# Patient Record
Sex: FEMALE | Race: WHITE | NOT HISPANIC OR LATINO | Employment: OTHER | URBAN - NONMETROPOLITAN AREA
[De-identification: names, ages, dates, MRNs, and addresses within clinical notes are randomized per-mention and may not be internally consistent; named-entity substitution may affect disease eponyms.]

---

## 2017-07-03 ENCOUNTER — APPOINTMENT (OUTPATIENT)
Dept: CT IMAGING | Facility: HOSPITAL | Age: 52
End: 2017-07-03

## 2017-07-03 ENCOUNTER — HOSPITAL ENCOUNTER (OUTPATIENT)
Facility: HOSPITAL | Age: 52
Setting detail: OBSERVATION
Discharge: HOME | End: 2017-07-04
Attending: INTERNAL MEDICINE | Admitting: INTERNAL MEDICINE

## 2017-07-03 ENCOUNTER — APPOINTMENT (OUTPATIENT)
Dept: GENERAL RADIOLOGY | Facility: HOSPITAL | Age: 52
End: 2017-07-03

## 2017-07-03 DIAGNOSIS — R07.2 PRECORDIAL PAIN: Primary | ICD-10-CM

## 2017-07-03 DIAGNOSIS — R74.8 ELEVATED AMYLASE AND LIPASE: ICD-10-CM

## 2017-07-03 LAB
ALBUMIN SERPL-MCNC: 4.4 G/DL (ref 3.5–5)
ALBUMIN/GLOB SERPL: 1.2 G/DL (ref 1.1–2.5)
ALP SERPL-CCNC: 102 U/L (ref 24–120)
ALT SERPL W P-5'-P-CCNC: 28 U/L (ref 0–54)
AMYLASE SERPL-CCNC: 209 U/L (ref 30–110)
ANION GAP SERPL CALCULATED.3IONS-SCNC: 11 MMOL/L (ref 4–13)
APTT PPP: 27.2 SECONDS (ref 24.1–34.8)
AST SERPL-CCNC: 24 U/L (ref 7–45)
BASOPHILS # BLD AUTO: 0.03 10*3/MM3 (ref 0–0.2)
BASOPHILS NFR BLD AUTO: 0.3 % (ref 0–2)
BILIRUB SERPL-MCNC: 0.4 MG/DL (ref 0.1–1)
BUN BLD-MCNC: 11 MG/DL (ref 5–21)
BUN/CREAT SERPL: 15.3 (ref 7–25)
CALCIUM SPEC-SCNC: 9.9 MG/DL (ref 8.4–10.4)
CHLORIDE SERPL-SCNC: 101 MMOL/L (ref 98–110)
CO2 SERPL-SCNC: 25 MMOL/L (ref 24–31)
CREAT BLD-MCNC: 0.72 MG/DL (ref 0.5–1.4)
D DIMER PPP FEU-MCNC: 0.55 MG/L (FEU) (ref 0–0.5)
DEPRECATED RDW RBC AUTO: 43.4 FL (ref 40–54)
EOSINOPHIL # BLD AUTO: 0.07 10*3/MM3 (ref 0–0.7)
EOSINOPHIL NFR BLD AUTO: 0.7 % (ref 0–4)
ERYTHROCYTE [DISTWIDTH] IN BLOOD BY AUTOMATED COUNT: 14.4 % (ref 12–15)
GFR SERPL CREATININE-BSD FRML MDRD: 85 ML/MIN/1.73
GLOBULIN UR ELPH-MCNC: 3.6 GM/DL
GLUCOSE BLD-MCNC: 96 MG/DL (ref 70–100)
HCT VFR BLD AUTO: 34.9 % (ref 37–47)
HGB BLD-MCNC: 11.2 G/DL (ref 12–16)
HOLD SPECIMEN: NORMAL
IMM GRANULOCYTES # BLD: 0.02 10*3/MM3 (ref 0–0.03)
IMM GRANULOCYTES NFR BLD: 0.2 % (ref 0–5)
INR PPP: 0.89 (ref 0.91–1.09)
LIPASE SERPL-CCNC: 470 U/L (ref 23–203)
LYMPHOCYTES # BLD AUTO: 1.98 10*3/MM3 (ref 0.72–4.86)
LYMPHOCYTES NFR BLD AUTO: 21.2 % (ref 15–45)
MCH RBC QN AUTO: 26 PG (ref 28–32)
MCHC RBC AUTO-ENTMCNC: 32.1 G/DL (ref 33–36)
MCV RBC AUTO: 81 FL (ref 82–98)
MONOCYTES # BLD AUTO: 0.74 10*3/MM3 (ref 0.19–1.3)
MONOCYTES NFR BLD AUTO: 7.9 % (ref 4–12)
NEUTROPHILS # BLD AUTO: 6.51 10*3/MM3 (ref 1.87–8.4)
NEUTROPHILS NFR BLD AUTO: 69.7 % (ref 39–78)
PLATELET # BLD AUTO: 309 10*3/MM3 (ref 130–400)
PMV BLD AUTO: 10 FL (ref 6–12)
POTASSIUM BLD-SCNC: 3.9 MMOL/L (ref 3.5–5.3)
PROT SERPL-MCNC: 8 G/DL (ref 6.3–8.7)
PROTHROMBIN TIME: 12.3 SECONDS (ref 11.9–14.6)
RBC # BLD AUTO: 4.31 10*6/MM3 (ref 4.2–5.4)
SODIUM BLD-SCNC: 137 MMOL/L (ref 135–145)
TROPONIN I SERPL-MCNC: <0.012 NG/ML (ref 0–0.03)
TROPONIN I SERPL-MCNC: <0.012 NG/ML (ref 0–0.03)
WBC NRBC COR # BLD: 9.35 10*3/MM3 (ref 4.8–10.8)
WHOLE BLOOD HOLD SPECIMEN: NORMAL
WHOLE BLOOD HOLD SPECIMEN: NORMAL

## 2017-07-03 PROCEDURE — 93005 ELECTROCARDIOGRAM TRACING: CPT | Performed by: INTERNAL MEDICINE

## 2017-07-03 PROCEDURE — 93005 ELECTROCARDIOGRAM TRACING: CPT | Performed by: NURSE PRACTITIONER

## 2017-07-03 PROCEDURE — 82150 ASSAY OF AMYLASE: CPT | Performed by: NURSE PRACTITIONER

## 2017-07-03 PROCEDURE — 71275 CT ANGIOGRAPHY CHEST: CPT

## 2017-07-03 PROCEDURE — 85379 FIBRIN DEGRADATION QUANT: CPT | Performed by: NURSE PRACTITIONER

## 2017-07-03 PROCEDURE — 93005 ELECTROCARDIOGRAM TRACING: CPT

## 2017-07-03 PROCEDURE — G0378 HOSPITAL OBSERVATION PER HR: HCPCS

## 2017-07-03 PROCEDURE — 25010000002 ENOXAPARIN PER 10 MG: Performed by: NURSE PRACTITIONER

## 2017-07-03 PROCEDURE — 85025 COMPLETE CBC W/AUTO DIFF WBC: CPT | Performed by: NURSE PRACTITIONER

## 2017-07-03 PROCEDURE — 93010 ELECTROCARDIOGRAM REPORT: CPT | Performed by: INTERNAL MEDICINE

## 2017-07-03 PROCEDURE — 96372 THER/PROPH/DIAG INJ SC/IM: CPT

## 2017-07-03 PROCEDURE — 71010 HC CHEST PA OR AP: CPT

## 2017-07-03 PROCEDURE — 83690 ASSAY OF LIPASE: CPT | Performed by: NURSE PRACTITIONER

## 2017-07-03 PROCEDURE — 85610 PROTHROMBIN TIME: CPT | Performed by: NURSE PRACTITIONER

## 2017-07-03 PROCEDURE — 80053 COMPREHEN METABOLIC PANEL: CPT | Performed by: NURSE PRACTITIONER

## 2017-07-03 PROCEDURE — 96374 THER/PROPH/DIAG INJ IV PUSH: CPT

## 2017-07-03 PROCEDURE — 84484 ASSAY OF TROPONIN QUANT: CPT | Performed by: NURSE PRACTITIONER

## 2017-07-03 PROCEDURE — 0 IOPAMIDOL PER 1 ML: Performed by: NURSE PRACTITIONER

## 2017-07-03 PROCEDURE — 99285 EMERGENCY DEPT VISIT HI MDM: CPT

## 2017-07-03 PROCEDURE — 85730 THROMBOPLASTIN TIME PARTIAL: CPT | Performed by: NURSE PRACTITIONER

## 2017-07-03 RX ORDER — ASPIRIN 81 MG/1
81 TABLET ORAL DAILY
Status: DISCONTINUED | OUTPATIENT
Start: 2017-07-04 | End: 2017-07-04 | Stop reason: HOSPADM

## 2017-07-03 RX ORDER — PANTOPRAZOLE SODIUM 40 MG/10ML
40 INJECTION, POWDER, LYOPHILIZED, FOR SOLUTION INTRAVENOUS ONCE
Status: COMPLETED | OUTPATIENT
Start: 2017-07-03 | End: 2017-07-03

## 2017-07-03 RX ORDER — SERTRALINE HYDROCHLORIDE 100 MG/1
200 TABLET, FILM COATED ORAL NIGHTLY
Status: DISCONTINUED | OUTPATIENT
Start: 2017-07-03 | End: 2017-07-04 | Stop reason: HOSPADM

## 2017-07-03 RX ORDER — SERTRALINE HYDROCHLORIDE 100 MG/1
200 TABLET, FILM COATED ORAL NIGHTLY
COMMUNITY

## 2017-07-03 RX ORDER — SODIUM CHLORIDE 0.9 % (FLUSH) 0.9 %
1-10 SYRINGE (ML) INJECTION AS NEEDED
Status: DISCONTINUED | OUTPATIENT
Start: 2017-07-03 | End: 2017-07-04 | Stop reason: HOSPADM

## 2017-07-03 RX ORDER — PANTOPRAZOLE SODIUM 40 MG/10ML
40 INJECTION, POWDER, LYOPHILIZED, FOR SOLUTION INTRAVENOUS ONCE
Status: DISCONTINUED | OUTPATIENT
Start: 2017-07-03 | End: 2017-07-03

## 2017-07-03 RX ORDER — ASPIRIN 81 MG/1
324 TABLET, CHEWABLE ORAL ONCE
Status: COMPLETED | OUTPATIENT
Start: 2017-07-03 | End: 2017-07-03

## 2017-07-03 RX ORDER — PANTOPRAZOLE SODIUM 40 MG/1
40 TABLET, DELAYED RELEASE ORAL
Status: DISCONTINUED | OUTPATIENT
Start: 2017-07-04 | End: 2017-07-04 | Stop reason: HOSPADM

## 2017-07-03 RX ORDER — SODIUM CHLORIDE 0.9 % (FLUSH) 0.9 %
10 SYRINGE (ML) INJECTION AS NEEDED
Status: DISCONTINUED | OUTPATIENT
Start: 2017-07-03 | End: 2017-07-04 | Stop reason: HOSPADM

## 2017-07-03 RX ORDER — ONDANSETRON 2 MG/ML
4 INJECTION INTRAMUSCULAR; INTRAVENOUS EVERY 6 HOURS PRN
Status: DISCONTINUED | OUTPATIENT
Start: 2017-07-03 | End: 2017-07-04 | Stop reason: HOSPADM

## 2017-07-03 RX ORDER — HYDROCODONE BITARTRATE AND ACETAMINOPHEN 5; 325 MG/1; MG/1
1 TABLET ORAL EVERY 4 HOURS PRN
Status: DISCONTINUED | OUTPATIENT
Start: 2017-07-03 | End: 2017-07-04 | Stop reason: HOSPADM

## 2017-07-03 RX ORDER — NITROGLYCERIN 0.4 MG/1
0.4 TABLET SUBLINGUAL
Status: COMPLETED | OUTPATIENT
Start: 2017-07-03 | End: 2017-07-03

## 2017-07-03 RX ORDER — LORAZEPAM 1 MG/1
1 TABLET ORAL EVERY 6 HOURS PRN
Status: DISCONTINUED | OUTPATIENT
Start: 2017-07-03 | End: 2017-07-04 | Stop reason: HOSPADM

## 2017-07-03 RX ADMIN — NITROGLYCERIN 0.4 MG: 0.4 TABLET SUBLINGUAL at 20:57

## 2017-07-03 RX ADMIN — NITROGLYCERIN 0.4 MG: 0.4 TABLET SUBLINGUAL at 16:37

## 2017-07-03 RX ADMIN — NITROGLYCERIN 0.4 MG: 0.4 TABLET SUBLINGUAL at 21:13

## 2017-07-03 RX ADMIN — ENOXAPARIN SODIUM 90 MG: 100 INJECTION SUBCUTANEOUS at 18:12

## 2017-07-03 RX ADMIN — Medication 324 MG: at 22:40

## 2017-07-03 RX ADMIN — PANTOPRAZOLE SODIUM 40 MG: 40 INJECTION, POWDER, FOR SOLUTION INTRAVENOUS at 17:58

## 2017-07-03 RX ADMIN — SERTRALINE 200 MG: 100 TABLET, FILM COATED ORAL at 22:41

## 2017-07-03 RX ADMIN — IOPAMIDOL 150 ML: 755 INJECTION, SOLUTION INTRAVENOUS at 17:27

## 2017-07-03 NOTE — ED PROVIDER NOTES
Subjective   HPI Comments: Patient is a 51-year-old white female presents with substernal chest pain or shortness of breath that started around 7:30 this morning.  She states the pain woke her out of her sleep.  She denies any nausea or vomiting.  She states she is having radiation of the pain into her left arm.  She states she was seen at [address] [place] [address] internal medicine and she had an EKG done and was advised that it was okay.  She states she belched and felt some better and went home.  She started states that she started having pain again within the last 2 hours.  She states she has taken 2 full strength aspirin today.    Patient is a 51 y.o. female presenting with chest pain.   History provided by:  Patient   used: No    Chest Pain       Review of Systems   Constitutional: Negative.    HENT: Negative.    Eyes: Negative.    Respiratory: Negative.    Cardiovascular: Positive for chest pain.        Pt presents with substernal chest pain and sob that started about 0730 this am. She states that the pain woke her out of her sleep. She states that she was seen at [facility] internal medicine this am and had ekg this am and was told that it was ok. She states that she belched and felt better. She states that she went on home and then started having chest pain again. She states that the pain is worse when she lies down. She states that she feels that she cannot take a deep breath in. She denies any recent illness. She denies cough. She denies fever or chills. She denies any hx of cardiac disease. She states that she did smoke for about 20 yrs, but stopped smoking about 15 years ago    Gastrointestinal: Negative.    Endocrine: Negative.    Genitourinary: Negative.    Musculoskeletal: Negative.    Skin: Negative.    Allergic/Immunologic: Negative.    Neurological: Negative.    Hematological: Negative.    Psychiatric/Behavioral: Negative.    All other systems reviewed and are negative.      Past Medical  "History:   Diagnosis Date   • Depression        No Known Allergies    History reviewed. No pertinent surgical history.    History reviewed. No pertinent family history.    Social History     Social History   • Marital status: Single     Spouse name: N/A   • Number of children: N/A   • Years of education: N/A     Social History Main Topics   • Smoking status: Never Smoker   • Smokeless tobacco: None   • Alcohol use No   • Drug use: No   • Sexual activity: Defer     Other Topics Concern   • None     Social History Narrative   • None       Prior to Admission medications    Not on File       /79 (Patient Position: Sitting)  Pulse 67  Temp 98.2 °F (36.8 °C) (Oral)   Resp 10  Ht 62\" (157.5 cm)  Wt 188 lb (85.3 kg)  SpO2 98%  BMI 34.39 kg/m2    Objective   Physical Exam   Constitutional: She is oriented to person, place, and time. She appears well-developed and well-nourished.   HENT:   Head: Normocephalic and atraumatic.   Eyes: Conjunctivae and EOM are normal. Pupils are equal, round, and reactive to light.   Neck: Normal range of motion. Neck supple. No tracheal deviation present. No thyromegaly present.   Cardiovascular: Normal rate, regular rhythm, normal heart sounds and intact distal pulses.    Pulmonary/Chest: Effort normal and breath sounds normal. No respiratory distress. She has no wheezes. She has no rales. She exhibits no tenderness.   Abdominal: Soft. Bowel sounds are normal.   Musculoskeletal: Normal range of motion.   Neurological: She is alert and oriented to person, place, and time. She has normal reflexes. No cranial nerve deficit.   Skin: Skin is warm and dry.   Psychiatric: She has a normal mood and affect. Her behavior is normal. Judgment and thought content normal.   Nursing note and vitals reviewed.      Procedures         Lab Results (last 24 hours)     Procedure Component Value Units Date/Time    CBC & Differential [[id number]] Collected:  07/03/17 1627    Specimen:  Blood Updated:  " 07/03/17 1643    Narrative:       The following orders were created for panel order CBC & Differential.  Procedure                               Abnormality         Status                     ---------                               -----------         ------                     CBC Auto Differential[[id number]]        Abnormal            Final result                 Please view results for these tests on the individual orders.    Comprehensive Metabolic Panel [[id number]] Collected:  07/03/17 1627    Specimen:  Blood Updated:  07/03/17 1649     Glucose 96 mg/dL      BUN 11 mg/dL      Creatinine 0.72 mg/dL      Sodium 137 mmol/L      Potassium 3.9 mmol/L      Chloride 101 mmol/L      CO2 25.0 mmol/L      Calcium 9.9 mg/dL      Total Protein 8.0 g/dL      Albumin 4.40 g/dL      ALT (SGPT) 28 U/L      AST (SGOT) 24 U/L      Alkaline Phosphatase 102 U/L      Total Bilirubin 0.4 mg/dL      eGFR Non African Amer 85 mL/min/1.73      Globulin 3.6 gm/dL      A/G Ratio 1.2 g/dL      BUN/Creatinine Ratio 15.3     Anion Gap 11.0 mmol/L     Protime-INR [[id number]]  (Abnormal) Collected:  07/03/17 1627    Specimen:  Blood Updated:  07/03/17 1651     Protime 12.3 Seconds      INR 0.89 (L)    aPTT [[id number]]  (Normal) Collected:  07/03/17 1627    Specimen:  Blood Updated:  07/03/17 1651     PTT 27.2 seconds     D-dimer, Quantitative [[id number]]  (Abnormal) Collected:  07/03/17 1627    Specimen:  Blood Updated:  07/03/17 1651     D-Dimer, Quantitative 0.55 (H) mg/L (FEU)     Narrative:       Reference Range is 0-0.50 mg/L FEU. However, results <0.50 mg/L FEU tends to rule out DVT or PE. Results >0.50 mg/L FEU are not useful in predicting absence or presence of DVT or PE.    Amylase [[id number]]  (Abnormal) Collected:  07/03/17 1627    Specimen:  Blood Updated:  07/03/17 1649     Amylase 209 (H) U/L     Lipase [[id number]]  (Abnormal) Collected:  07/03/17 1627    Specimen:  Blood Updated:  07/03/17 1649     Lipase 470 (H) U/L     CBC  Auto Differential [[id number]]  (Abnormal) Collected:  07/03/17 1627    Specimen:  Blood Updated:  07/03/17 1643     WBC 9.35 10*3/mm3      RBC 4.31 10*6/mm3      Hemoglobin 11.2 (L) g/dL      Hematocrit 34.9 (L) %      MCV 81.0 (L) fL      MCH 26.0 (L) pg      MCHC 32.1 (L) g/dL      RDW 14.4 %      RDW-SD 43.4 fl      MPV 10.0 fL      Platelets 309 10*3/mm3      Neutrophil % 69.7 %      Lymphocyte % 21.2 %      Monocyte % 7.9 %      Eosinophil % 0.7 %      Basophil % 0.3 %      Immature Grans % 0.2 %      Neutrophils, Absolute 6.51 10*3/mm3      Lymphocytes, Absolute 1.98 10*3/mm3      Monocytes, Absolute 0.74 10*3/mm3      Eosinophils, Absolute 0.07 10*3/mm3      Basophils, Absolute 0.03 10*3/mm3      Immature Grans, Absolute 0.02 10*3/mm3     Troponin [[id number]]  (Normal) Collected:  07/03/17 1627    Specimen:  Blood Updated:  07/03/17 1719     Troponin I <0.012 ng/mL     Troponin [[id number]]  (Normal) Collected:  07/03/17 1921    Specimen:  Blood Updated:  07/03/17 1950     Troponin I <0.012 ng/mL           XR Chest 1 View   ED Interpretation   1. No radiographic evidence of acute cardiopulmonary process.           This report was finalized on 07/03/2017 16:42 by Dr. Kasi Quintero MD.      Final Result   1. No radiographic evidence of acute cardiopulmonary process.           This report was finalized on 07/03/2017 16:42 by Dr. Kasi Quintero MD.      CT Angiogram Chest With Contrast   ED Interpretation   Negative exam.   This report was finalized on 07/03/2017 17:39 by Dr. Ramon Vasquez MD.      Final Result   Negative exam.   This report was finalized on 07/03/2017 17:39 by Dr. Ramon Vasquez MD.          ED Course  ED Course   Comment By Time   Reeval after ntg. Pt states that her pain has gone from 7 to a 3 and that she feels much better at this time. Systolic b/p dropped from 120 to 111. Will not administer another ntg at this time LUCERO Melgoza 07/03 7135   Pt states that her pain is still  better at this time. Call placed to hospitalist for further LUCERO Melgoza 07/03 1746   Spoke with dr luong- has accepted for admission LUCERO Melgoza 07/03 1806     HEART Score  History: Slightly suspicious (+0)  ECG: Normal (+0)  Age: 45 through 65 (+1)  Risk Factors: 1 - 2 risk factors (+1)  Troponin: Normal limit or lower (+0)  Total: 2        MDM  Number of Diagnoses or Management Options  Elevated amylase and lipase: new and requires workup  Precordial pain: new and requires workup     Amount and/or Complexity of Data Reviewed  Clinical lab tests: ordered and reviewed  Tests in the radiology section of CPT®: ordered and reviewed  Discuss the patient with other providers: yes (Reviewed pt and pt care plan with dr luong- also assessed pt and in agreement with pt care plan - has accepted for admission)  Independent visualization of images, tracings, or specimens: yes    Patient Progress  Patient progress: stable      Final diagnoses:   Precordial pain   Elevated amylase and lipase            LUCERO Melgoza  07/03/17 1959

## 2017-07-04 ENCOUNTER — APPOINTMENT (OUTPATIENT)
Dept: CARDIOLOGY | Facility: HOSPITAL | Age: 52
End: 2017-07-04

## 2017-07-04 VITALS
HEIGHT: 62 IN | DIASTOLIC BLOOD PRESSURE: 79 MMHG | SYSTOLIC BLOOD PRESSURE: 123 MMHG | TEMPERATURE: 97.8 F | WEIGHT: 186.13 LBS | BODY MASS INDEX: 34.25 KG/M2 | RESPIRATION RATE: 18 BRPM | HEART RATE: 80 BPM | OXYGEN SATURATION: 98 %

## 2017-07-04 LAB
AMYLASE SERPL-CCNC: 107 U/L (ref 30–110)
ANION GAP SERPL CALCULATED.3IONS-SCNC: 11 MMOL/L (ref 4–13)
ANISOCYTOSIS BLD QL: NORMAL
ARTICHOKE IGE QN: 159 MG/DL (ref 0–99)
BACTERIA UR QL AUTO: ABNORMAL /HPF
BH CV STRESS BP STAGE 1: NORMAL
BH CV STRESS BP STAGE 2: NORMAL
BH CV STRESS BP STAGE 3: NORMAL
BH CV STRESS DURATION MIN STAGE 1: 3
BH CV STRESS DURATION MIN STAGE 2: 3
BH CV STRESS DURATION MIN STAGE 3: 3
BH CV STRESS DURATION SEC STAGE 1: 0
BH CV STRESS DURATION SEC STAGE 2: 0
BH CV STRESS DURATION SEC STAGE 3: 0
BH CV STRESS GRADE STAGE 1: 10
BH CV STRESS GRADE STAGE 2: 12
BH CV STRESS GRADE STAGE 3: 14
BH CV STRESS HR STAGE 1: 117
BH CV STRESS HR STAGE 2: 133
BH CV STRESS HR STAGE 3: 153
BH CV STRESS METS STAGE 1: 5
BH CV STRESS METS STAGE 2: 7.5
BH CV STRESS METS STAGE 3: 10
BH CV STRESS PROTOCOL 1: NORMAL
BH CV STRESS RECOVERY BP: NORMAL MMHG
BH CV STRESS RECOVERY HR: 74 BPM
BH CV STRESS SPEED STAGE 1: 1.7
BH CV STRESS SPEED STAGE 2: 2.5
BH CV STRESS SPEED STAGE 3: 3.4
BH CV STRESS STAGE 1: 1
BH CV STRESS STAGE 2: 2
BH CV STRESS STAGE 3: 3
BILIRUB UR QL STRIP: NEGATIVE
BUN BLD-MCNC: 9 MG/DL (ref 5–21)
BUN/CREAT SERPL: 12.3 (ref 7–25)
CALCIUM SPEC-SCNC: 9.3 MG/DL (ref 8.4–10.4)
CHLORIDE SERPL-SCNC: 102 MMOL/L (ref 98–110)
CHOLEST SERPL-MCNC: 232 MG/DL (ref 130–200)
CLARITY UR: CLEAR
CO2 SERPL-SCNC: 27 MMOL/L (ref 24–31)
COLOR UR: YELLOW
CREAT BLD-MCNC: 0.73 MG/DL (ref 0.5–1.4)
DEPRECATED RDW RBC AUTO: 43.3 FL (ref 40–54)
ERYTHROCYTE [DISTWIDTH] IN BLOOD BY AUTOMATED COUNT: 14.6 % (ref 12–15)
GFR SERPL CREATININE-BSD FRML MDRD: 84 ML/MIN/1.73
GLUCOSE BLD-MCNC: 104 MG/DL (ref 70–100)
GLUCOSE UR STRIP-MCNC: NEGATIVE MG/DL
HBA1C MFR BLD: 5.3 %
HCT VFR BLD AUTO: 32.1 % (ref 37–47)
HDLC SERPL-MCNC: 45 MG/DL
HGB BLD-MCNC: 10.4 G/DL (ref 12–16)
HGB UR QL STRIP.AUTO: ABNORMAL
HYALINE CASTS UR QL AUTO: ABNORMAL /LPF
KETONES UR QL STRIP: NEGATIVE
LDLC/HDLC SERPL: 3.49 {RATIO}
LEUKOCYTE ESTERASE UR QL STRIP.AUTO: NEGATIVE
LIPASE SERPL-CCNC: 64 U/L (ref 23–203)
LYMPHOCYTES # BLD MANUAL: 2.77 10*3/MM3 (ref 0.72–4.86)
LYMPHOCYTES NFR BLD MANUAL: 33 % (ref 15–45)
LYMPHOCYTES NFR BLD MANUAL: 5 % (ref 4–12)
MAXIMAL PREDICTED HEART RATE: 169 BPM
MCH RBC QN AUTO: 26.2 PG (ref 28–32)
MCHC RBC AUTO-ENTMCNC: 32.4 G/DL (ref 33–36)
MCV RBC AUTO: 80.9 FL (ref 82–98)
MICROCYTES BLD QL: NORMAL
MONOCYTES # BLD AUTO: 0.42 10*3/MM3 (ref 0.19–1.3)
NEUTROPHILS # BLD AUTO: 5.21 10*3/MM3 (ref 1.87–8.4)
NEUTROPHILS NFR BLD MANUAL: 62 % (ref 39–78)
NITRITE UR QL STRIP: NEGATIVE
PERCENT MAX PREDICTED HR: 90.53 %
PH UR STRIP.AUTO: 6 [PH] (ref 5–8)
PLAT MORPH BLD: NORMAL
PLATELET # BLD AUTO: 253 10*3/MM3 (ref 130–400)
PMV BLD AUTO: 9.5 FL (ref 6–12)
POIKILOCYTOSIS BLD QL SMEAR: NORMAL
POTASSIUM BLD-SCNC: 3.8 MMOL/L (ref 3.5–5.3)
PROT UR QL STRIP: NEGATIVE
RBC # BLD AUTO: 3.97 10*6/MM3 (ref 4.2–5.4)
RBC # UR: ABNORMAL /HPF
REF LAB TEST METHOD: ABNORMAL
SODIUM BLD-SCNC: 140 MMOL/L (ref 135–145)
SP GR UR STRIP: 1.02 (ref 1–1.03)
SQUAMOUS #/AREA URNS HPF: ABNORMAL /HPF
STRESS BASELINE BP: NORMAL MMHG
STRESS BASELINE HR: 80 BPM
STRESS PERCENT HR: 107 %
STRESS POST EXERCISE DUR MIN: 9 MIN
STRESS POST EXERCISE DUR SEC: 0 SEC
STRESS POST PEAK BP: NORMAL MMHG
STRESS POST PEAK HR: 153 BPM
STRESS TARGET HR: 144 BPM
TRIGL SERPL-MCNC: 149 MG/DL (ref 0–149)
TROPONIN I SERPL-MCNC: <0.012 NG/ML (ref 0–0.03)
UROBILINOGEN UR QL STRIP: ABNORMAL
WBC MORPH BLD: NORMAL
WBC NRBC COR # BLD: 8.4 10*3/MM3 (ref 4.8–10.8)
WBC UR QL AUTO: ABNORMAL /HPF

## 2017-07-04 PROCEDURE — 93005 ELECTROCARDIOGRAM TRACING: CPT | Performed by: INTERNAL MEDICINE

## 2017-07-04 PROCEDURE — 93350 STRESS TTE ONLY: CPT | Performed by: INTERNAL MEDICINE

## 2017-07-04 PROCEDURE — 85007 BL SMEAR W/DIFF WBC COUNT: CPT | Performed by: INTERNAL MEDICINE

## 2017-07-04 PROCEDURE — 93010 ELECTROCARDIOGRAM REPORT: CPT | Performed by: INTERNAL MEDICINE

## 2017-07-04 PROCEDURE — G0378 HOSPITAL OBSERVATION PER HR: HCPCS

## 2017-07-04 PROCEDURE — 25010000002 PERFLUTREN (DEFINITY) 8.476 MG IN SODIUM CHLORIDE 10 ML INJECTION: Performed by: INTERNAL MEDICINE

## 2017-07-04 PROCEDURE — 93018 CV STRESS TEST I&R ONLY: CPT | Performed by: INTERNAL MEDICINE

## 2017-07-04 PROCEDURE — C8928 TTE W OR W/O FOL W/CON,STRES: HCPCS

## 2017-07-04 PROCEDURE — 93017 CV STRESS TEST TRACING ONLY: CPT

## 2017-07-04 PROCEDURE — 85027 COMPLETE CBC AUTOMATED: CPT | Performed by: INTERNAL MEDICINE

## 2017-07-04 PROCEDURE — 83690 ASSAY OF LIPASE: CPT | Performed by: INTERNAL MEDICINE

## 2017-07-04 PROCEDURE — 83036 HEMOGLOBIN GLYCOSYLATED A1C: CPT | Performed by: INTERNAL MEDICINE

## 2017-07-04 PROCEDURE — 80061 LIPID PANEL: CPT | Performed by: INTERNAL MEDICINE

## 2017-07-04 PROCEDURE — 84484 ASSAY OF TROPONIN QUANT: CPT | Performed by: INTERNAL MEDICINE

## 2017-07-04 PROCEDURE — 93352 ADMIN ECG CONTRAST AGENT: CPT | Performed by: INTERNAL MEDICINE

## 2017-07-04 PROCEDURE — 82150 ASSAY OF AMYLASE: CPT | Performed by: INTERNAL MEDICINE

## 2017-07-04 PROCEDURE — 81001 URINALYSIS AUTO W/SCOPE: CPT | Performed by: NURSE PRACTITIONER

## 2017-07-04 PROCEDURE — 80048 BASIC METABOLIC PNL TOTAL CA: CPT | Performed by: INTERNAL MEDICINE

## 2017-07-04 PROCEDURE — 36415 COLL VENOUS BLD VENIPUNCTURE: CPT | Performed by: INTERNAL MEDICINE

## 2017-07-04 RX ORDER — ATORVASTATIN CALCIUM 40 MG/1
40 TABLET, FILM COATED ORAL NIGHTLY
Status: DISCONTINUED | OUTPATIENT
Start: 2017-07-04 | End: 2017-07-04 | Stop reason: HOSPADM

## 2017-07-04 RX ORDER — PANTOPRAZOLE SODIUM 40 MG/1
40 TABLET, DELAYED RELEASE ORAL DAILY
Qty: 30 TABLET | Refills: 1 | Status: SHIPPED | OUTPATIENT
Start: 2017-07-04

## 2017-07-04 RX ORDER — ATORVASTATIN CALCIUM 40 MG/1
40 TABLET, FILM COATED ORAL NIGHTLY
Qty: 30 TABLET | Refills: 1 | Status: SHIPPED | OUTPATIENT
Start: 2017-07-04

## 2017-07-04 RX ADMIN — SODIUM CHLORIDE 10 ML: 9 INJECTION INTRAMUSCULAR; INTRAVENOUS; SUBCUTANEOUS at 09:27

## 2017-07-04 RX ADMIN — PANTOPRAZOLE SODIUM 40 MG: 40 TABLET, DELAYED RELEASE ORAL at 06:06

## 2017-07-04 NOTE — H&P
[facility] Medicine Services  HISTORY AND PHYSICAL    Date of Admission: 7/3/2017  Primary Care Physician: LUCERO Klein    Subjective     Chief Complaint: Chest Pain    History of Present Illness  Patient is a 51-year-old white female no significant past medical history.  Who has a strong family history of cardiac disease.  She started having chest pain when she woke this morning.  She went to a local primary care doctor and had an EKG done which was reportedly normal.  With that time she belched and it relieved her symptoms so she went on about her business.  The pain recurred and she came to the emergency room.  In the ER she has had a normal troponin and normal EKG and was given one nitroglycerin sublingually and it immediately relieved her pain.  She has never had anything like this before she denies any dyspnea on exertion she denies any PND swelling.  She used to smoke she quit approximately 15 years ago and she smoked for 20 years prior to that.  She is uncertain about her cholesterol levels.  Currently she is relatively pain free.  She states that the only eliciting factor is taking a deep breath made worse at one point.        Review of Systems   14 point review of systems are negative except for as per HPI  Otherwise complete ROS reviewed and negative except as mentioned in the HPI.      Past Medical History:   Past Medical History:   Diagnosis Date   • Depression        Past Surgical History:History reviewed. No pertinent surgical history.    Social History:  reports that she has never smoked. She does not have any smokeless tobacco history on file. She reports that she does not drink alcohol or use illicit drugs.    Family History: family history is not on file.   Family history is positive for coronary artery disease and both parents and grandparents on both sides.    Allergies:  No Known Allergies    Medications:  Prior to Admission medications    Medication  "Sig Start Date End Date Taking? Authorizing Provider   sertraline (ZOLOFT) 100 MG tablet Take 200 mg by mouth Every Night.   Yes Historical Provider, MD       Objective     Vital Signs: /72  Pulse 76  Temp 97.8 °F (36.6 °C) (Tympanic)   Resp 17  Ht 62\" (157.5 cm)  Wt 188 lb (85.3 kg)  SpO2 98%  BMI 34.39 kg/m2  Physical Exam  Gen.: Well-nourished well-developed white female no acute distress resting comfortably in stretcher  HEENT: Atraumatic normocephalic pupils are equal round reactive to light extra commits intact sclerae are anicteric tympanic membranes are unremarkable mucous membranes are moist oropharynx without erythema or exudate dentition is normal.  Neck is supple without lymphadenopathy no JVD is noted no carotid bruits are auscultated  CV: Regular rate and rhythm normal S1-S2 no gallops murmurs or rubs  Chest: Clear to auscultation percussion bilaterally no tenderness to palpation and sternum or epigastrium  Abdomen: Soft nontender nondistended positive bowel sounds no hepatosplenomegaly no masses are palpated no guarding or rebound tenderness is noted  Extremities: No clubbing edema or cyanosis pedal pulses are 2+ and symmetric  Neuro: Cranial 2-12 grossly intact motor is 5 out of 5 bilaterally sensory exam is nonfocal  Skin: Warm dry and intact no evidence of breakdown tattoo on left wrist        Results Reviewed:  Lab Results (last 24 hours)     Procedure Component Value Units Date/Time    CBC & Differential [[id number]] Collected:  07/03/17 1627    Specimen:  Blood Updated:  07/03/17 1643    Narrative:       The following orders were created for panel order CBC & Differential.  Procedure                               Abnormality         Status                     ---------                               -----------         ------                     CBC Auto Differential[[id number]]        Abnormal            Final result                 Please view results for these tests on the " individual orders.    CBC Auto Differential [[id number]]  (Abnormal) Collected:  07/03/17 1627    Specimen:  Blood Updated:  07/03/17 1643     WBC 9.35 10*3/mm3      RBC 4.31 10*6/mm3      Hemoglobin 11.2 (L) g/dL      Hematocrit 34.9 (L) %      MCV 81.0 (L) fL      MCH 26.0 (L) pg      MCHC 32.1 (L) g/dL      RDW 14.4 %      RDW-SD 43.4 fl      MPV 10.0 fL      Platelets 309 10*3/mm3      Neutrophil % 69.7 %      Lymphocyte % 21.2 %      Monocyte % 7.9 %      Eosinophil % 0.7 %      Basophil % 0.3 %      Immature Grans % 0.2 %      Neutrophils, Absolute 6.51 10*3/mm3      Lymphocytes, Absolute 1.98 10*3/mm3      Monocytes, Absolute 0.74 10*3/mm3      Eosinophils, Absolute 0.07 10*3/mm3      Basophils, Absolute 0.03 10*3/mm3      Immature Grans, Absolute 0.02 10*3/mm3     Comprehensive Metabolic Panel [[id number]] Collected:  07/03/17 1627    Specimen:  Blood Updated:  07/03/17 1649     Glucose 96 mg/dL      BUN 11 mg/dL      Creatinine 0.72 mg/dL      Sodium 137 mmol/L      Potassium 3.9 mmol/L      Chloride 101 mmol/L      CO2 25.0 mmol/L      Calcium 9.9 mg/dL      Total Protein 8.0 g/dL      Albumin 4.40 g/dL      ALT (SGPT) 28 U/L      AST (SGOT) 24 U/L      Alkaline Phosphatase 102 U/L      Total Bilirubin 0.4 mg/dL      eGFR Non African Amer 85 mL/min/1.73      Globulin 3.6 gm/dL      A/G Ratio 1.2 g/dL      BUN/Creatinine Ratio 15.3     Anion Gap 11.0 mmol/L     Amylase [[id number]]  (Abnormal) Collected:  07/03/17 1627    Specimen:  Blood Updated:  07/03/17 1649     Amylase 209 (H) U/L     Lipase [[id number]]  (Abnormal) Collected:  07/03/17 1627    Specimen:  Blood Updated:  07/03/17 1649     Lipase 470 (H) U/L     Protime-INR [[id number]]  (Abnormal) Collected:  07/03/17 1627    Specimen:  Blood Updated:  07/03/17 1651     Protime 12.3 Seconds      INR 0.89 (L)    aPTT [[id number]]  (Normal) Collected:  07/03/17 1627    Specimen:  Blood Updated:  07/03/17 1651     PTT 27.2 seconds     D-dimer, Quantitative  [[id number]]  (Abnormal) Collected:  07/03/17 1627    Specimen:  Blood Updated:  07/03/17 1651     D-Dimer, Quantitative 0.55 (H) mg/L (FEU)     Narrative:       Reference Range is 0-0.50 mg/L FEU. However, results <0.50 mg/L FEU tends to rule out DVT or PE. Results >0.50 mg/L FEU are not useful in predicting absence or presence of DVT or PE.    Troponin [[id number]]  (Normal) Collected:  07/03/17 1627    Specimen:  Blood Updated:  07/03/17 1719     Troponin I <0.012 ng/mL     Light Blue Top [[id number]] Collected:  07/03/17 1627    Specimen:  Blood Updated:  07/03/17 1801     Extra Tube hold for add-on      Auto resulted       Green Top (Gel) [[id number]] Collected:  07/03/17 1627    Specimen:  Blood Updated:  07/03/17 1801     Extra Tube Hold for add-ons.      Auto resulted.       Lavender Top [[id number]] Collected:  07/03/17 1627    Specimen:  Blood Updated:  07/03/17 1801     Extra Tube hold for add-on      Auto resulted       [place] Draw [[id number]] Collected:  07/03/17 1627    Specimen:  Blood Updated:  07/03/17 1829    Narrative:       The following orders were created for panel order [place] Draw.  Procedure                               Abnormality         Status                     ---------                               -----------         ------                     Light Blue Top[[id number]]                                   Final result               Green Top (Gel)[[id number]]                                  Final result               Lavender Top[[id number]]                                     Final result               Red Top[[id number]]                                                                       Please view results for these tests on the individual orders.        Imaging Results (last 24 hours)     Procedure Component Value Units Date/Time    CT Angiogram Chest With Contrast [[id number]] Collected:  07/03/17 1737     Updated:  07/03/17 1745    Narrative:       EXAMINATION:  CT ANGIOGRAM CHEST W  CONTRAST-  7/3/2017 5:01 PM CDT     HISTORY: Chest pain with shortness of breath. Elevated d-dimer.     COMPARISON : No comparison study.     DLP: 525 mGy-cm. Automated dosage control was utilized.     TECHNIQUE: CT angio was performed of the chest with contrast. Coronal,  sagittal and 3-D reconstruction were performed.     FINDINGS: The thoracic aorta is normal in caliber with no aneurysm or  dissection. The pulmonary arteries demonstrate no filling defects. There  is no infiltrate or effusion. The extreme lung bases are not completely  included on the images. I do not believe this is significant. There are  mild degenerative changes of the spine.       Impression:       Negative exam.  This report was finalized on 07/03/2017 17:39 by Dr. Ramon Vasquez MD.    XR Chest 1 View [[id number]] Collected:  07/03/17 1642     Updated:  07/03/17 1745    Narrative:       EXAMINATION:   XR CHEST 1 VW-  7/3/2017 4:42 PM CDT     HISTORY: Chest pain      Frontal upright radiograph of the chest 7/3/2017 4:40 PM CDT     COMPARISON: None.     FINDINGS:   The lungs are clear. The cardiomediastinal silhouette and pulmonary  vascularity are within normal limits.      The osseous structures and surrounding soft tissues demonstrate no acute  abnormality.       Impression:       1. No radiographic evidence of acute cardiopulmonary process.        This report was finalized on 07/03/2017 16:42 by Dr. Kasi Quintero MD.          I have personally reviewed and interpreted the radiology studies and ECG obtained at time of admission.     Assessment / Plan     Assessment & Plan  Hospital Problem List     Precordial pain        1.  Chest pain rule out for myocardial infarction with serial enzymes and EKGs anticoagulating with Lovenox will obtain a stress echo in the morning.  Patient is currently pain-free we will give her an aspirin and monitor for signs symptoms of recurrent chest pain.  We will also check lipid panel morning fasting.  2.   Elevated amylase lipase otherwise asymptomatic recheck enzymes in the morning.  I discussed doing a HIDA scan and ultrasound of her gallbladder she wishes to do this as an outpatient at all possible due to the holiday.  I think this is appropriate.        Code Status: Full  she does not have a healthcare power surrogate and will consider this she is able to make her own decisions at this time.     I discussed the patients findings and my recommendations with patient   Estimated length of stay one day   Vinicio Ray MD   07/03/17   7:03 PM

## 2017-07-04 NOTE — DISCHARGE SUMMARY
"    [facility] Medicine Services  DISCHARGE SUMMARY       Date of Admission: 7/3/2017  Date of Discharge:  2017  Primary Care Physician: LUCERO Klein    Presenting Problem/History of Present Illness:  Precordial pain [R07.2]     Final Discharge Diagnoses:  Hospital Problem List     Precordial pain      1. Atypical chest pain  2. Elevated Amylase and lipase, resolved  3. Hyperlipidemia    Consults: None     Procedures Performed:   Yahaira Brady   Echocardiogram stress test and limited echocardiogram with contrast   Order# [id number]    Reading physician: Mu Villarreal MD   Ordering physician: LUCERO Zhang   Study date: 17         Patient Information      Patient Name MRN Sex  (Age)     Yahaira Brady [id number] Female 1965 (51 y.o.)       Admission Information      Admission Date/Time Discharge Date/Time Room/Bed     17  1615  402/1       Patient Height & Weight      Height Weight BSA (Calculated - sq m) BMI (kg/m2) Pulse     62\" (157.5 cm) 186 lb 2 oz (84.4 kg) 1.85 sq meters 34.11 80       Patient Vitals      BP Pulse     123/79 80       Reason For Exam      Chest Pain       Cardiac History      No past medical history on file.       Social History      Tobacco Use      Former Smoker; Quit 7/3/2002; Smoked for 20 years; Smoked: Cigarettes.     Smokeless Tobacco: Never used smokeless tobacco.              Family Cardiac History as of 2017      Problem Relation Age of Onset     Heart disease Maternal Grandfather        Interpretation Summary      · Low risk stress test.  · No clinical, ECG, or echocardiographic evidence of ischemia.  · Duke Treadmill Score +9 (low risk, which confers <1% probability of CV mortality in 12 months).     Pertinent Test Results:   Lab Results (last 24 hours)     Procedure Component Value Units Date/Time    CBC & Differential [[id number]] Collected:  17 1627    Specimen:  Blood Updated:  17 1643    " Narrative:       The following orders were created for panel order CBC & Differential.  Procedure                               Abnormality         Status                     ---------                               -----------         ------                     CBC Auto Differential[[id number]]        Abnormal            Final result                 Please view results for these tests on the individual orders.    CBC Auto Differential [[id number]]  (Abnormal) Collected:  07/03/17 1627    Specimen:  Blood Updated:  07/03/17 1643     WBC 9.35 10*3/mm3      RBC 4.31 10*6/mm3      Hemoglobin 11.2 (L) g/dL      Hematocrit 34.9 (L) %      MCV 81.0 (L) fL      MCH 26.0 (L) pg      MCHC 32.1 (L) g/dL      RDW 14.4 %      RDW-SD 43.4 fl      MPV 10.0 fL      Platelets 309 10*3/mm3      Neutrophil % 69.7 %      Lymphocyte % 21.2 %      Monocyte % 7.9 %      Eosinophil % 0.7 %      Basophil % 0.3 %      Immature Grans % 0.2 %      Neutrophils, Absolute 6.51 10*3/mm3      Lymphocytes, Absolute 1.98 10*3/mm3      Monocytes, Absolute 0.74 10*3/mm3      Eosinophils, Absolute 0.07 10*3/mm3      Basophils, Absolute 0.03 10*3/mm3      Immature Grans, Absolute 0.02 10*3/mm3     Comprehensive Metabolic Panel [[id number]] Collected:  07/03/17 1627    Specimen:  Blood Updated:  07/03/17 1649     Glucose 96 mg/dL      BUN 11 mg/dL      Creatinine 0.72 mg/dL      Sodium 137 mmol/L      Potassium 3.9 mmol/L      Chloride 101 mmol/L      CO2 25.0 mmol/L      Calcium 9.9 mg/dL      Total Protein 8.0 g/dL      Albumin 4.40 g/dL      ALT (SGPT) 28 U/L      AST (SGOT) 24 U/L      Alkaline Phosphatase 102 U/L      Total Bilirubin 0.4 mg/dL      eGFR Non African Amer 85 mL/min/1.73      Globulin 3.6 gm/dL      A/G Ratio 1.2 g/dL      BUN/Creatinine Ratio 15.3     Anion Gap 11.0 mmol/L     Amylase [[id number]]  (Abnormal) Collected:  07/03/17 1627    Specimen:  Blood Updated:  07/03/17 1649     Amylase 209 (H) U/L     Lipase [[id number]]  (Abnormal)  Collected:  07/03/17 1627    Specimen:  Blood Updated:  07/03/17 1649     Lipase 470 (H) U/L     Protime-INR [[id number]]  (Abnormal) Collected:  07/03/17 1627    Specimen:  Blood Updated:  07/03/17 1651     Protime 12.3 Seconds      INR 0.89 (L)    aPTT [[id number]]  (Normal) Collected:  07/03/17 1627    Specimen:  Blood Updated:  07/03/17 1651     PTT 27.2 seconds     D-dimer, Quantitative [[id number]]  (Abnormal) Collected:  07/03/17 1627    Specimen:  Blood Updated:  07/03/17 1651     D-Dimer, Quantitative 0.55 (H) mg/L (FEU)     Narrative:       Reference Range is 0-0.50 mg/L FEU. However, results <0.50 mg/L FEU tends to rule out DVT or PE. Results >0.50 mg/L FEU are not useful in predicting absence or presence of DVT or PE.    Troponin [[id number]]  (Normal) Collected:  07/03/17 1627    Specimen:  Blood Updated:  07/03/17 1719     Troponin I <0.012 ng/mL     Light Blue Top [[id number]] Collected:  07/03/17 1627    Specimen:  Blood Updated:  07/03/17 1801     Extra Tube hold for add-on      Auto resulted       Green Top (Gel) [[id number]] Collected:  07/03/17 1627    Specimen:  Blood Updated:  07/03/17 1801     Extra Tube Hold for add-ons.      Auto resulted.       Lavender Top [[id number]] Collected:  07/03/17 1627    Specimen:  Blood Updated:  07/03/17 1801     Extra Tube hold for add-on      Auto resulted       Converse Draw [[id number]] Collected:  07/03/17 1627    Specimen:  Blood Updated:  07/03/17 1829    Narrative:       The following orders were created for panel order Converse Draw.  Procedure                               Abnormality         Status                     ---------                               -----------         ------                     Light Blue Top[[id number]]                                   Final result               Green Top (Gel)[[id number]]                                  Final result               Lavender Top[[id number]]                                     Final result                Red Top[[id number]]                                                                       Please view results for these tests on the individual orders.    Troponin [[id number]]  (Normal) Collected:  07/03/17 1921    Specimen:  Blood Updated:  07/03/17 1950     Troponin I <0.012 ng/mL     LSAC Slide Creation [[id number]] Collected:  07/04/17 0130    Specimen:  Blood Updated:  07/04/17 0141    Basic Metabolic Panel [[id number]]  (Abnormal) Collected:  07/04/17 0130    Specimen:  Blood Updated:  07/04/17 0149     Glucose 104 (H) mg/dL      BUN 9 mg/dL      Creatinine 0.73 mg/dL      Sodium 140 mmol/L      Potassium 3.8 mmol/L      Chloride 102 mmol/L      CO2 27.0 mmol/L      Calcium 9.3 mg/dL      eGFR Non African Amer 84 mL/min/1.73      BUN/Creatinine Ratio 12.3     Anion Gap 11.0 mmol/L     Narrative:       GFR Normal >60  Chronic Kidney Disease <60  Kidney Failure <15    Amylase [[id number]]  (Normal) Collected:  07/04/17 0130    Specimen:  Blood Updated:  07/04/17 0149     Amylase 107 U/L     Lipase [[id number]]  (Normal) Collected:  07/04/17 0130    Specimen:  Blood Updated:  07/04/17 0149     Lipase 64 U/L     Lipid Panel [[id number]]  (Abnormal) Collected:  07/04/17 0130    Specimen:  Blood Updated:  07/04/17 0200     Total Cholesterol 232 (H) mg/dL      Triglycerides 149 mg/dL      HDL Cholesterol 45 (L) mg/dL      LDL Cholesterol  159 (H) mg/dL      LDL/HDL Ratio 3.49    Troponin [[id number]]  (Normal) Collected:  07/04/17 0130    Specimen:  Blood Updated:  07/04/17 0200     Troponin I <0.012 ng/mL     CBC Auto Differential [[id number]]  (Abnormal) Collected:  07/04/17 0130    Specimen:  Blood Updated:  07/04/17 0215     WBC 8.40 10*3/mm3      RBC 3.97 (L) 10*6/mm3      Hemoglobin 10.4 (L) g/dL      Hematocrit 32.1 (L) %      MCV 80.9 (L) fL      MCH 26.2 (L) pg      MCHC 32.4 (L) g/dL      RDW 14.6 %      RDW-SD 43.3 fl      MPV 9.5 fL      Platelets 253 10*3/mm3     CBC & Differential [[id number]] Collected:   07/04/17 0130    Specimen:  Blood Updated:  07/04/17 0219    Narrative:       The following orders were created for panel order CBC & Differential.  Procedure                               Abnormality         Status                     ---------                               -----------         ------                     Manual Differential[[id number]]                              Final result               CBC Auto Differential[[id number]]        Abnormal            Final result                 Please view results for these tests on the individual orders.    Manual Differential [[id number]] Collected:  07/04/17 0130    Specimen:  Blood Updated:  07/04/17 0219     Neutrophil % 62.0 %      Lymphocyte % 33.0 %      Monocyte % 5.0 %      Neutrophils Absolute 5.21 10*3/mm3      Lymphocytes Absolute 2.77 10*3/mm3      Monocytes Absolute 0.42 10*3/mm3      Anisocytosis Slight/1+     Microcytes Slight/1+     Poikilocytes Slight/1+     WBC Morphology Normal     Platelet Morphology Normal    Hemoglobin A1c [[id number]] Collected:  07/04/17 0130    Specimen:  Blood Updated:  07/04/17 0621     Hemoglobin A1C 5.3 %     Narrative:       Less than 6.0           Non-Diabetic Range  6.0-7.0                 ADA Therapeutic Target  Greater than 7.0        Action Suggested    Urinalysis With / Culture If Indicated [[id number]]  (Abnormal) Collected:  07/04/17 0739    Specimen:  Urine from Urine, Clean Catch Updated:  07/04/17 0754     Color, UA Yellow     Appearance, UA Clear     pH, UA 6.0     Specific Gravity, UA 1.024     Glucose, UA Negative     Ketones, UA Negative     Bilirubin, UA Negative     Blood, UA Small (1+) (A)     Protein, UA Negative     Leuk Esterase, UA Negative     Nitrite, UA Negative     Urobilinogen, UA 0.2 E.U./dL    Urinalysis, Microscopic Only [[id number]]  (Abnormal) Collected:  07/04/17 0739    Specimen:  Urine from Urine, Clean Catch Updated:  07/04/17 0754     RBC, UA 6-12 (A) /HPF      WBC, UA None Seen /HPF       Bacteria, UA None Seen /HPF      Squamous Epithelial Cells, UA 0-2 /HPF      Hyaline Casts, UA 0-2 /LPF      Methodology Automated Microscopy        Imaging Results (last 24 hours)     Procedure Component Value Units Date/Time    CT Angiogram Chest With Contrast [[id number]] Collected:  07/03/17 1737     Updated:  07/03/17 1745    Narrative:       EXAMINATION:  CT ANGIOGRAM CHEST W CONTRAST-  7/3/2017 5:01 PM CDT     HISTORY: Chest pain with shortness of breath. Elevated d-dimer.     COMPARISON : No comparison study.     DLP: 525 mGy-cm. Automated dosage control was utilized.     TECHNIQUE: CT angio was performed of the chest with contrast. Coronal,  sagittal and 3-D reconstruction were performed.     FINDINGS: The thoracic aorta is normal in caliber with no aneurysm or  dissection. The pulmonary arteries demonstrate no filling defects. There  is no infiltrate or effusion. The extreme lung bases are not completely  included on the images. I do not believe this is significant. There are  mild degenerative changes of the spine.       Impression:       Negative exam.  This report was finalized on 07/03/2017 17:39 by Dr. Ramon Vasquez MD.    XR Chest 1 View [[id number]] Collected:  07/03/17 1642     Updated:  07/03/17 1745    Narrative:       EXAMINATION:   XR CHEST 1 VW-  7/3/2017 4:42 PM CDT     HISTORY: Chest pain      Frontal upright radiograph of the chest 7/3/2017 4:40 PM CDT     COMPARISON: None.     FINDINGS:   The lungs are clear. The cardiomediastinal silhouette and pulmonary  vascularity are within normal limits.      The osseous structures and surrounding soft tissues demonstrate no acute  abnormality.       Impression:       1. No radiographic evidence of acute cardiopulmonary process.        This report was finalized on 07/03/2017 16:42 by Dr. Kais Quintero MD.        Hospital Course:  The patient is a 51 y.o. female who presented to [facility] with chest pain.  She has no history of heart  "disease personally and has never had a pain like this before.  She was admitted for further workup.  She was given one nitroglycerin and relieved her chest pain.  She has had three negative troponins.  She has not had any further chest pain since being admitted.  She had a stress test done today that is reported as low risk for ischemia.  She will be discharged home today.  It was noted that she had elevated cholesterol levels so she will be started and discharged on Lipitor.  She will follow up with her PCP in one week.  She is stable and ok for discharge today.       Condition on Discharge:  Stable     Physical Exam on Discharge:  /79  Pulse 80  Temp 97.8 °F (36.6 °C) (Temporal Artery )   Resp 18  Ht 62\" (157.5 cm)  Wt 186 lb 2 oz (84.4 kg)  SpO2 98%  BMI 34.04 kg/m2     Physical Exam   Constitutional: She is oriented to person, place, and time. She appears well-developed and well-nourished.   HENT:   Head: Normocephalic and atraumatic.   Eyes: Conjunctivae and EOM are normal. Pupils are equal, round, and reactive to light.   Neck: Neck supple. No JVD present. No thyromegaly present.   Cardiovascular: Normal rate, regular rhythm, normal heart sounds and intact distal pulses.  Exam reveals no gallop and no friction rub.    No murmur heard.  Pulmonary/Chest: Effort normal and breath sounds normal. No respiratory distress. She has no wheezes. She has no rales. She exhibits no tenderness.   Abdominal: Soft. Bowel sounds are normal. She exhibits no distension. There is no tenderness. There is no rebound and no guarding.   Musculoskeletal: Normal range of motion. She exhibits no edema, tenderness or deformity.   Lymphadenopathy:     She has no cervical adenopathy.   Neurological: She is alert and oriented to person, place, and time. She displays normal reflexes. No cranial nerve deficit. She exhibits normal muscle tone.   Skin: Skin is warm and dry. No rash noted.   Psychiatric: She has a normal mood and " affect. Her behavior is normal. Judgment and thought content normal.     Discharge Disposition:  Home or Self Care    Discharge Medications:   Yahaira Brady   Home Medication Instructions VANESSA:[id number]    Printed on:07/04/17 7889   Medication Information                      atorvastatin (LIPITOR) 40 MG tablet  Take 1 tablet by mouth Every Night.             pantoprazole (PROTONIX) 40 MG EC tablet  Take 1 tablet by mouth Daily.             sertraline (ZOLOFT) 100 MG tablet  Take 200 mg by mouth Every Night.               Discharge Diet:   Diet Instructions     Advance Diet As Tolerated                   Activity at Discharge:   Activity Instructions     Activity as Tolerated                   Discharge Care Plan/Instructions:   1. Start Lipitor daily    Follow-up Appointments:  LUCERO Klein in one week, may need GI evaluation      No future appointments.    Test Results Pending at Discharge: None    LUCERO Zhang  07/04/17  11:06 AM    Time: 25 minutes       I personally evaluated and examined the patient in conjunction with LUCERO Zhang and agree with the assessment, treatment plan, and disposition of the patient as recorded by her. My history, exam, and further recommendations are:     Agree with discharge home her enzymes have normalized not sure if she had a gallbladder attack or what.  Needs US and HIDA.  Encourage patient to get OTC Proton pump inhibitor.    Vinicio Ray MD  07/04/17  1:28 PM

## 2017-07-04 NOTE — PLAN OF CARE
Problem: Patient Care Overview (Adult)  Goal: Plan of Care Review  Outcome: Ongoing (interventions implemented as appropriate)    07/04/17 0411   Coping/Psychosocial Response Interventions   Plan Of Care Reviewed With patient   Patient Care Overview   Progress no change   Outcome Evaluation   Outcome Summary/Follow up Plan Pt admitted for observation r/t c/o chest pain. Troponins and EKGs have all been negative; 2 nitros given after admit to floor; pain relieved after some time; no other symptoms or signs of distress noted; pt rested well; safety maintained       Goal: Adult Individualization and Mutuality  Outcome: Ongoing (interventions implemented as appropriate)  Goal: Discharge Needs Assessment  Outcome: Ongoing (interventions implemented as appropriate)    Problem: Pain, Acute (Adult)  Goal: Identify Related Risk Factors and Signs and Symptoms  Outcome: Outcome(s) achieved Date Met:  07/04/17  Goal: Acceptable Pain Control/Comfort Level  Outcome: Ongoing (interventions implemented as appropriate)    07/04/17 0411   Pain, Acute (Adult)   Acceptable Pain Control/Comfort Level making progress toward outcome

## 2017-07-04 NOTE — DISCHARGE INSTRUCTIONS
Gradually resume your normal activity.    When you have your followup appointment please discusss need for possible outpatient gastroenterology followup.

## 2017-07-04 NOTE — ED NOTES
Attempted to call report, nurse is in the middle of a bandage change and will call back.     Cookie Spicer RN  07/03/17 4919

## 2024-02-27 ENCOUNTER — TELEPHONE (OUTPATIENT)
Dept: INTERNAL MEDICINE | Age: 59
End: 2024-02-27

## 2024-02-27 NOTE — TELEPHONE ENCOUNTER
Pt would like to establish care with   Dr. Nur. She was referred by Samantha Josue, a current pt of the the   doctor. Please advise if she can establish. Also pt name listed on chart   is backwards. It should be Rhoda Molina.

## 2024-02-28 NOTE — TELEPHONE ENCOUNTER
Pt currently sees a Physician at [facility].     Pt currently takes:  Zoloft  Atorvastatin    Pt states that she is wanting to switch to us because she needs a PCP that is a female, she used to have a female PCP there, but she left and had been placed with a Man and she doesn't really wants to see a man for her pap smears. Also pt states that she has been having hot flashes that are waking her up at night, and her joints are starting to hurt her all of the sudden.

## 2024-04-10 ENCOUNTER — OFFICE VISIT (OUTPATIENT)
Dept: INTERNAL MEDICINE | Age: 59
End: 2024-04-10

## 2024-04-10 ENCOUNTER — TELEPHONE (OUTPATIENT)
Dept: OTHER | Age: 59
End: 2024-04-10

## 2024-04-10 VITALS
DIASTOLIC BLOOD PRESSURE: 86 MMHG | BODY MASS INDEX: 35.81 KG/M2 | WEIGHT: 194.6 LBS | OXYGEN SATURATION: 97 % | HEIGHT: 62 IN | HEART RATE: 62 BPM | SYSTOLIC BLOOD PRESSURE: 130 MMHG

## 2024-04-10 DIAGNOSIS — Z80.3 FAMILY HISTORY OF MALIGNANT NEOPLASM OF BREAST: ICD-10-CM

## 2024-04-10 DIAGNOSIS — F33.2 ENDOGENOUS DEPRESSION (HCC): ICD-10-CM

## 2024-04-10 DIAGNOSIS — Z80.3 FAMILY HISTORY OF BREAST CANCER: ICD-10-CM

## 2024-04-10 DIAGNOSIS — Z12.31 ENCOUNTER FOR SCREENING MAMMOGRAM FOR BREAST CANCER: ICD-10-CM

## 2024-04-10 DIAGNOSIS — Z78.0 POSTMENOPAUSAL: ICD-10-CM

## 2024-04-10 DIAGNOSIS — F41.1 GENERALIZED ANXIETY DISORDER: ICD-10-CM

## 2024-04-10 DIAGNOSIS — R23.2 HOT FLASHES: ICD-10-CM

## 2024-04-10 DIAGNOSIS — E78.2 MIXED HYPERLIPIDEMIA: ICD-10-CM

## 2024-04-10 DIAGNOSIS — R73.01 IFG (IMPAIRED FASTING GLUCOSE): ICD-10-CM

## 2024-04-10 DIAGNOSIS — E78.2 MIXED HYPERLIPIDEMIA: Primary | ICD-10-CM

## 2024-04-10 DIAGNOSIS — E55.9 VITAMIN D DEFICIENCY: ICD-10-CM

## 2024-04-10 DIAGNOSIS — E66.09 EXOGENOUS OBESITY: ICD-10-CM

## 2024-04-10 LAB
25(OH)D3 SERPL-MCNC: 33.6 NG/ML
ALBUMIN SERPL-MCNC: 4.5 G/DL (ref 3.5–5.2)
ALP SERPL-CCNC: 111 U/L (ref 35–104)
ALT SERPL-CCNC: 20 U/L (ref 5–33)
ANION GAP SERPL CALCULATED.3IONS-SCNC: 12 MMOL/L (ref 7–19)
AST SERPL-CCNC: 20 U/L (ref 5–32)
BASOPHILS # BLD: 0 K/UL (ref 0–0.2)
BASOPHILS NFR BLD: 0.8 % (ref 0–1)
BILIRUB SERPL-MCNC: 0.3 MG/DL (ref 0.2–1.2)
BILIRUB UR QL STRIP: NEGATIVE
BUN SERPL-MCNC: 7 MG/DL (ref 6–20)
CALCIUM SERPL-MCNC: 9.5 MG/DL (ref 8.6–10)
CHLORIDE SERPL-SCNC: 104 MMOL/L (ref 98–111)
CHOLEST SERPL-MCNC: 170 MG/DL (ref 160–199)
CLARITY UR: CLEAR
CO2 SERPL-SCNC: 26 MMOL/L (ref 22–29)
COLOR UR: YELLOW
CREAT SERPL-MCNC: 0.6 MG/DL (ref 0.5–0.9)
EOSINOPHIL # BLD: 0.1 K/UL (ref 0–0.6)
EOSINOPHIL NFR BLD: 1.5 % (ref 0–5)
ERYTHROCYTE [DISTWIDTH] IN BLOOD BY AUTOMATED COUNT: 15.7 % (ref 11.5–14.5)
GLUCOSE SERPL-MCNC: 102 MG/DL (ref 74–109)
GLUCOSE UR STRIP.AUTO-MCNC: NEGATIVE MG/DL
HBA1C MFR BLD: 5.3 % (ref 4–6)
HCT VFR BLD AUTO: 37.8 % (ref 37–47)
HDLC SERPL-MCNC: 64 MG/DL (ref 65–121)
HGB BLD-MCNC: 12.2 G/DL (ref 12–16)
HGB UR STRIP.AUTO-MCNC: NEGATIVE MG/L
IMM GRANULOCYTES # BLD: 0 K/UL
KETONES UR STRIP.AUTO-MCNC: NEGATIVE MG/DL
LDLC SERPL CALC-MCNC: 82 MG/DL
LEUKOCYTE ESTERASE UR QL STRIP.AUTO: NEGATIVE
LYMPHOCYTES # BLD: 1.8 K/UL (ref 1.1–4.5)
LYMPHOCYTES NFR BLD: 33.7 % (ref 20–40)
MCH RBC QN AUTO: 27.7 PG (ref 27–31)
MCHC RBC AUTO-ENTMCNC: 32.3 G/DL (ref 33–37)
MCV RBC AUTO: 85.9 FL (ref 81–99)
MONOCYTES # BLD: 0.4 K/UL (ref 0–0.9)
MONOCYTES NFR BLD: 7.4 % (ref 0–10)
NEUTROPHILS # BLD: 3 K/UL (ref 1.5–7.5)
NEUTS SEG NFR BLD: 56.2 % (ref 50–65)
NITRITE UR QL STRIP.AUTO: NEGATIVE
PH UR STRIP.AUTO: 7.5 [PH] (ref 5–8)
PLATELET # BLD AUTO: 257 K/UL (ref 130–400)
PMV BLD AUTO: 9.8 FL (ref 9.4–12.3)
POTASSIUM SERPL-SCNC: 4.2 MMOL/L (ref 3.5–5)
PROGEST SERPL-MCNC: <0.05 NG/ML
PROT SERPL-MCNC: 7.4 G/DL (ref 6.6–8.7)
PROT UR STRIP.AUTO-MCNC: NEGATIVE MG/DL
RBC # BLD AUTO: 4.4 M/UL (ref 4.2–5.4)
SODIUM SERPL-SCNC: 142 MMOL/L (ref 136–145)
SP GR UR STRIP.AUTO: 1.01 (ref 1–1.03)
TESTOST SERPL-MCNC: 8.7 NG/DL (ref 2.9–40.8)
TRIGL SERPL-MCNC: 120 MG/DL (ref 0–149)
TSH SERPL DL<=0.005 MIU/L-ACNC: 1.06 UIU/ML (ref 0.27–4.2)
UROBILINOGEN UR STRIP.AUTO-MCNC: 0.2 E.U./DL
WBC # BLD AUTO: 5.3 K/UL (ref 4.8–10.8)

## 2024-04-10 PROCEDURE — 99205 OFFICE O/P NEW HI 60 MIN: CPT | Performed by: INTERNAL MEDICINE

## 2024-04-10 RX ORDER — UBIDECARENONE/VIT E/VIT E MIX 100-20-15
200 CAPSULE ORAL
COMMUNITY

## 2024-04-10 RX ORDER — ATORVASTATIN CALCIUM 10 MG/1
10 TABLET, FILM COATED ORAL DAILY
COMMUNITY
End: 2024-04-10 | Stop reason: SDUPTHER

## 2024-04-10 RX ORDER — SERTRALINE HYDROCHLORIDE 100 MG/1
100 TABLET, FILM COATED ORAL 2 TIMES DAILY
Qty: 180 TABLET | Refills: 1 | Status: SHIPPED | OUTPATIENT
Start: 2024-04-10

## 2024-04-10 RX ORDER — ATORVASTATIN CALCIUM 10 MG/1
10 TABLET, FILM COATED ORAL DAILY
Qty: 90 TABLET | Refills: 1 | Status: SHIPPED | OUTPATIENT
Start: 2024-04-10

## 2024-04-10 RX ORDER — SERTRALINE HYDROCHLORIDE 100 MG/1
100 TABLET, FILM COATED ORAL 2 TIMES DAILY
COMMUNITY
End: 2024-04-10 | Stop reason: SDUPTHER

## 2024-04-10 RX ORDER — CALCIUM CARBONATE 500(1250)
600 TABLET ORAL DAILY
COMMUNITY

## 2024-04-10 SDOH — ECONOMIC STABILITY: INCOME INSECURITY: HOW HARD IS IT FOR YOU TO PAY FOR THE VERY BASICS LIKE FOOD, HOUSING, MEDICAL CARE, AND HEATING?: PATIENT DECLINED

## 2024-04-10 SDOH — ECONOMIC STABILITY: FOOD INSECURITY: WITHIN THE PAST 12 MONTHS, YOU WORRIED THAT YOUR FOOD WOULD RUN OUT BEFORE YOU GOT MONEY TO BUY MORE.: PATIENT DECLINED

## 2024-04-10 SDOH — ECONOMIC STABILITY: FOOD INSECURITY: WITHIN THE PAST 12 MONTHS, THE FOOD YOU BOUGHT JUST DIDN'T LAST AND YOU DIDN'T HAVE MONEY TO GET MORE.: PATIENT DECLINED

## 2024-04-10 SDOH — ECONOMIC STABILITY: HOUSING INSECURITY
IN THE LAST 12 MONTHS, WAS THERE A TIME WHEN YOU DID NOT HAVE A STEADY PLACE TO SLEEP OR SLEPT IN A SHELTER (INCLUDING NOW)?: PATIENT DECLINED

## 2024-04-10 ASSESSMENT — PATIENT HEALTH QUESTIONNAIRE - PHQ9
1. LITTLE INTEREST OR PLEASURE IN DOING THINGS: NOT AT ALL
SUM OF ALL RESPONSES TO PHQ QUESTIONS 1-9: 0
2. FEELING DOWN, DEPRESSED OR HOPELESS: NOT AT ALL
SUM OF ALL RESPONSES TO PHQ9 QUESTIONS 1 & 2: 0
SUM OF ALL RESPONSES TO PHQ QUESTIONS 1-9: 0

## 2024-04-10 ASSESSMENT — ENCOUNTER SYMPTOMS
CHEST TIGHTNESS: 0
COUGH: 0
SORE THROAT: 0
CONSTIPATION: 0
WHEEZING: 0
ABDOMINAL PAIN: 0

## 2024-04-10 NOTE — TELEPHONE ENCOUNTER
Patient recently was identified as having a significant personal and/or family history of cancer. Referral received for hereditary genetic testing. We discussed in detail their personal/family history of cancer. Patient will be tested for 81 gene mutations. Once sample is submitted and the lab receives it, it will take 3-4 weeks to get results. A follow-up appointment was made with myself to disclose patient's results. All questions were answered and patient agreed to testing. Patient recently was identified as having a significant personal and/or family history of cancer. Referral received for hereditary genetic testing. We discussed in detail their personal/family history of cancer. Patient will be tested for 81 gene mutations. Once sample is submitted and the lab receives it, it will take 3-4 weeks to get results. A follow-up appointment was made with myself to disclose patient's results. All questions were answered and patient agreed to testing.

## 2024-04-10 NOTE — PROGRESS NOTES
Chief Complaint   Patient presents with    New Patient     Discuss hormone meds   Was going to [facility]      History of presenting illness:  Rhoda Molina is a58 y.o. female who presents today for follow up on her chronic medical conditions as noted below.    Past Medical History:   Diagnosis Date    Anxiety     Hyperlipidemia       No past surgical history on file.  Current Outpatient Medications   Medication Sig Dispense Refill    calcium carbonate (OSCAL) 500 MG TABS tablet Take 600 mg by mouth daily      Multiple Vitamins-Minerals (WOMENS MULTIVITAMIN PO) Take by mouth      Co-Enzyme Q10 100 MG CAPS Take 200 mg by mouth      atorvastatin (LIPITOR) 10 MG tablet Take 1 tablet by mouth daily 90 tablet 1    sertraline (ZOLOFT) 100 MG tablet Take 1 tablet by mouth in the morning and at bedtime 180 tablet 1     No current facility-administered medications for this visit.     No Known Allergies  Social History     Tobacco Use    Smoking status: Former     Current packs/day: 0.00     Average packs/day: 1 pack/day for 12.0 years (12.0 ttl pk-yrs)     Types: Cigarettes     Start date:      Quit date:      Years since quittin.2    Smokeless tobacco: Never   Substance Use Topics    Alcohol use: Never      Family History   Problem Relation Age of Onset    No Known Problems Mother     No Known Problems Father     Breast Cancer Sister 60    Breast Cancer Maternal Grandmother 80    Breast Cancer Paternal Aunt 70    Breast Cancer Paternal Cousin 42       Review of Systems   Constitutional:  Positive for fatigue. Negative for chills and fever.   HENT:  Negative for congestion, ear pain, nosebleeds, postnasal drip and sore throat.    Respiratory:  Negative for cough, chest tightness and wheezing.    Cardiovascular:  Negative for chest pain, palpitations and leg swelling.   Gastrointestinal:  Negative for abdominal pain and constipation.   Genitourinary:  Negative for dysuria and urgency.

## 2024-04-14 LAB — ESTROGEN SERPL-MCNC: 135 PG/ML (ref 40–244)

## 2024-04-23 LAB
Lab: NEGATIVE
Lab: NORMAL

## 2024-04-26 ENCOUNTER — TELEPHONE (OUTPATIENT)
Dept: OTHER | Age: 59
End: 2024-04-26

## 2024-05-02 ENCOUNTER — TELEPHONE (OUTPATIENT)
Dept: OTHER | Age: 59
End: 2024-05-02

## 2024-05-02 NOTE — TELEPHONE ENCOUNTER
Completed a post-test results disclosure discussion with patient.  The patient's testing was Negative for a clinically actionable gene mutation.    However, the patients Tyrer-Cuzick score was greater than 20%.    This indicates that the patient is considered HIGH RISK.    The following are recommendations and management guidelines for a TC score greater than 20%.        Patient voiced understanding.  Patient was offered a referral to our High Risk Breast Program and breast health management with Willie Dunham PA-C and accepted.  Appointment made for:   TC Score: 23.6%  A copy of this report and elevated Risk Score can be viewed under the Media tab.

## 2024-10-24 RX ORDER — SERTRALINE HYDROCHLORIDE 100 MG/1
100 TABLET, FILM COATED ORAL 2 TIMES DAILY
Qty: 180 TABLET | Refills: 0 | Status: SHIPPED | OUTPATIENT
Start: 2024-10-24

## 2024-10-24 NOTE — TELEPHONE ENCOUNTER
Rhoda Jesse called to request a refill on her medication.      Last office visit : 4/10/2024   Next office visit : Visit date not found     Requested Prescriptions     Pending Prescriptions Disp Refills    sertraline (ZOLOFT) 100 MG tablet [Pharmacy Med Name: Sertraline HCl 100 MG Oral Tablet] 180 tablet 0     Sig: TAKE 1 TABLET BY MOUTH IN THE MORNING AND AT BEDTIME            Claudia Marie MA

## 2025-01-20 RX ORDER — SERTRALINE HYDROCHLORIDE 100 MG/1
100 TABLET, FILM COATED ORAL 2 TIMES DAILY
Qty: 180 TABLET | Refills: 0 | Status: SHIPPED | OUTPATIENT
Start: 2025-01-20

## 2025-04-17 ENCOUNTER — OFFICE VISIT (OUTPATIENT)
Dept: INTERNAL MEDICINE | Age: 60
End: 2025-04-17

## 2025-04-17 VITALS
HEART RATE: 63 BPM | DIASTOLIC BLOOD PRESSURE: 72 MMHG | WEIGHT: 162.2 LBS | HEIGHT: 62 IN | SYSTOLIC BLOOD PRESSURE: 120 MMHG | BODY MASS INDEX: 29.85 KG/M2 | OXYGEN SATURATION: 97 %

## 2025-04-17 DIAGNOSIS — Z00.00 ANNUAL PHYSICAL EXAM: Primary | ICD-10-CM

## 2025-04-17 DIAGNOSIS — Z12.4 CERVICAL CANCER SCREENING: ICD-10-CM

## 2025-04-17 DIAGNOSIS — R73.03 PREDIABETES: ICD-10-CM

## 2025-04-17 DIAGNOSIS — Z12.31 ENCOUNTER FOR SCREENING MAMMOGRAM FOR MALIGNANT NEOPLASM OF BREAST: ICD-10-CM

## 2025-04-17 DIAGNOSIS — E55.9 VITAMIN D DEFICIENCY: ICD-10-CM

## 2025-04-17 DIAGNOSIS — E66.09 EXOGENOUS OBESITY: ICD-10-CM

## 2025-04-17 DIAGNOSIS — F41.1 GENERALIZED ANXIETY DISORDER: ICD-10-CM

## 2025-04-17 DIAGNOSIS — E78.2 MIXED HYPERLIPIDEMIA: ICD-10-CM

## 2025-04-17 PROCEDURE — 99396 PREV VISIT EST AGE 40-64: CPT | Performed by: INTERNAL MEDICINE

## 2025-04-17 RX ORDER — GINGER ROOT/GINGER ROOT EXT 262.5 MG
CAPSULE ORAL
COMMUNITY
Start: 2022-01-01

## 2025-04-17 RX ORDER — TESTOSTERONE 100 MG
162 PELLET (EA) IMPLANTATION
COMMUNITY
Start: 2024-07-01

## 2025-04-17 RX ORDER — THYROID 30 MG/1
TABLET ORAL
COMMUNITY
Start: 2025-03-01

## 2025-04-17 RX ORDER — MAGNESIUM 200 MG
TABLET ORAL
COMMUNITY
Start: 2024-04-01

## 2025-04-17 RX ORDER — SERTRALINE HYDROCHLORIDE 100 MG/1
100 TABLET, FILM COATED ORAL 2 TIMES DAILY
Qty: 180 TABLET | Refills: 0 | Status: SHIPPED | OUTPATIENT
Start: 2025-04-17 | End: 2025-04-18

## 2025-04-17 RX ORDER — PROGESTERONE 100 MG/1
100 CAPSULE ORAL NIGHTLY
COMMUNITY
Start: 2025-04-05

## 2025-04-17 SDOH — ECONOMIC STABILITY: FOOD INSECURITY: WITHIN THE PAST 12 MONTHS, THE FOOD YOU BOUGHT JUST DIDN'T LAST AND YOU DIDN'T HAVE MONEY TO GET MORE.: PATIENT DECLINED

## 2025-04-17 SDOH — ECONOMIC STABILITY: FOOD INSECURITY: WITHIN THE PAST 12 MONTHS, YOU WORRIED THAT YOUR FOOD WOULD RUN OUT BEFORE YOU GOT MONEY TO BUY MORE.: PATIENT DECLINED

## 2025-04-17 ASSESSMENT — ENCOUNTER SYMPTOMS
CONSTIPATION: 0
CHEST TIGHTNESS: 0
ABDOMINAL PAIN: 0
WHEEZING: 0
COUGH: 0
SORE THROAT: 0

## 2025-04-17 ASSESSMENT — PATIENT HEALTH QUESTIONNAIRE - PHQ9
SUM OF ALL RESPONSES TO PHQ QUESTIONS 1-9: 0
3. TROUBLE FALLING OR STAYING ASLEEP: NOT AT ALL
6. FEELING BAD ABOUT YOURSELF - OR THAT YOU ARE A FAILURE OR HAVE LET YOURSELF OR YOUR FAMILY DOWN: NOT AT ALL
2. FEELING DOWN, DEPRESSED OR HOPELESS: NOT AT ALL
1. LITTLE INTEREST OR PLEASURE IN DOING THINGS: NOT AT ALL
5. POOR APPETITE OR OVEREATING: NOT AT ALL
SUM OF ALL RESPONSES TO PHQ QUESTIONS 1-9: 0
SUM OF ALL RESPONSES TO PHQ QUESTIONS 1-9: 0
9. THOUGHTS THAT YOU WOULD BE BETTER OFF DEAD, OR OF HURTING YOURSELF: NOT AT ALL
4. FEELING TIRED OR HAVING LITTLE ENERGY: NOT AT ALL
8. MOVING OR SPEAKING SO SLOWLY THAT OTHER PEOPLE COULD HAVE NOTICED. OR THE OPPOSITE, BEING SO FIGETY OR RESTLESS THAT YOU HAVE BEEN MOVING AROUND A LOT MORE THAN USUAL: NOT AT ALL
7. TROUBLE CONCENTRATING ON THINGS, SUCH AS READING THE NEWSPAPER OR WATCHING TELEVISION: NOT AT ALL
SUM OF ALL RESPONSES TO PHQ QUESTIONS 1-9: 0
10. IF YOU CHECKED OFF ANY PROBLEMS, HOW DIFFICULT HAVE THESE PROBLEMS MADE IT FOR YOU TO DO YOUR WORK, TAKE CARE OF THINGS AT HOME, OR GET ALONG WITH OTHER PEOPLE: NOT DIFFICULT AT ALL

## 2025-04-17 NOTE — PROGRESS NOTES
Chief Complaint:   Rhoda Molina is a 59 y.o. female who presents forcomplete physical exam.    History of Present Illness:      Rhoda Molina is a 59 y.o. female who presents todayfor wellness visit AND follow up on her chronic medical conditions as noted below.      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    Current Outpatient Medications   Medication Sig Dispense Refill    Probiotic Product (PROBIOTIC BLEND PO) Take by mouth      calcium carb-cholecalciferol (CALTRATE 600+D3) 600-20 MG-MCG TABS       magnesium 200 MG TABS tablet       thyroid (NP THYROID) 30 MG tablet       progesterone (PROMETRIUM) 100 MG CAPS capsule Take 1 capsule by mouth at bedtime      Testosterone 100 MG PLLT 162 mg.      ESTROGENS CONJUGATED PO 10 mg      TIRZEPATIDE SC Inject 37.5 Units into the skin      Multiple Vitamins-Minerals (WOMENS MULTIVITAMIN PO) Take by mouth      Co-Enzyme Q10 100 MG CAPS Take 200 mg by mouth      Hormone Cream Base CREA        No current facility-administered medications for this visit.     No Known Allergies    Social History     Socioeconomic History    Marital status: Single   Tobacco Use    Smoking status: Former     Current packs/day: 0.00     Average packs/day: 1 pack/day for 12.0 years (12.0 ttl pk-yrs)     Types: Cigarettes     Start date:      Quit date: 2004     Years since quittin.3    Smokeless tobacco: Never   Vaping Use    Vaping status: Never Used   Substance and Sexual Activity    Alcohol use: Never    Drug use: Never     Social Drivers of Health     Financial Resource Strain: Patient Declined (4/10/2024)    Overall Financial Resource Strain (CARDIA)     Difficulty of Paying Living Expenses: Patient

## 2025-04-21 RX ORDER — SERTRALINE HYDROCHLORIDE 100 MG/1
100 TABLET, FILM COATED ORAL 2 TIMES DAILY
Qty: 180 TABLET | Refills: 0 | Status: SHIPPED | OUTPATIENT
Start: 2025-04-21

## 2025-04-21 NOTE — TELEPHONE ENCOUNTER
Rhoda Jesse called to request a refill on her medication.      Last office visit : 4/17/2025   Next office visit : Visit date not found     Requested Prescriptions     Pending Prescriptions Disp Refills    sertraline (ZOLOFT) 100 MG tablet [Pharmacy Med Name: Sertraline HCl 100 MG Oral Tablet] 180 tablet 0     Sig: TAKE 1 TABLET BY MOUTH IN THE MORNING AND AT BEDTIME            Claudia Marie MA

## 2025-04-24 ENCOUNTER — RESULTS FOLLOW-UP (OUTPATIENT)
Dept: INTERNAL MEDICINE | Age: 60
End: 2025-04-24

## 2025-06-19 RX ORDER — SERTRALINE HYDROCHLORIDE 100 MG/1
100 TABLET, FILM COATED ORAL 2 TIMES DAILY
Qty: 180 TABLET | Refills: 0 | Status: SHIPPED | OUTPATIENT
Start: 2025-06-19

## 2025-06-19 NOTE — TELEPHONE ENCOUNTER
Rhoda Jesse called to request a refill on her medication.      Last office visit : 4/17/2025   Next office visit : 4/16/2026    Requested Prescriptions     Pending Prescriptions Disp Refills    sertraline (ZOLOFT) 100 MG tablet [Pharmacy Med Name: Sertraline HCl 100 MG Oral Tablet] 180 tablet 0     Sig: TAKE 1 TABLET BY MOUTH IN THE MORNING AND AT BEDTIME            Claudia Marie MA

## 2025-06-25 DIAGNOSIS — Z12.31 ENCOUNTER FOR SCREENING MAMMOGRAM FOR MALIGNANT NEOPLASM OF BREAST: ICD-10-CM
